# Patient Record
Sex: MALE | Race: WHITE
[De-identification: names, ages, dates, MRNs, and addresses within clinical notes are randomized per-mention and may not be internally consistent; named-entity substitution may affect disease eponyms.]

---

## 2017-01-27 ENCOUNTER — HOSPITAL ENCOUNTER (EMERGENCY)
Dept: HOSPITAL 25 - UCEAST | Age: 62
Discharge: HOME | End: 2017-01-27
Payer: MEDICARE

## 2017-01-27 VITALS — DIASTOLIC BLOOD PRESSURE: 71 MMHG | SYSTOLIC BLOOD PRESSURE: 108 MMHG

## 2017-01-27 DIAGNOSIS — K52.9: Primary | ICD-10-CM

## 2017-01-27 PROCEDURE — 99212 OFFICE O/P EST SF 10 MIN: CPT

## 2017-01-27 PROCEDURE — G0463 HOSPITAL OUTPT CLINIC VISIT: HCPCS

## 2017-01-27 NOTE — UC
Abdominal Pain Male HPI





- HPI Summary


HPI Summary: 





62 yo male (non verbal/MR) presents with one episode of diarrhea and one 

episode of vomiting in past 24 hours


no fever


vomited about 1 pm


since then has been able to tolerate clear liquids








- History of Current Complaint


Chief Complaint: UCGI


Stated Complaint: VOMITING


Time Seen by Provider: 01/27/17 17:47


Hx Obtained From: Family/Caretaker


Hx From Patient Unobtainable Due To: Other - no verbal


Onset/Duration: Gradual Onset, Lasting Days - 1


Timing: Intermittent Episodes Lasting: - NA


Severity Initially: Mild


Severity Currently: None


Pain Intensity: 0


Pain Scale Used: Adult Non Verbal


Location: Other - none


Aggravating Factor(s):: Nothing


Alleviating Factor(s): Nothing


Associated Signs And Symptoms: Positive: Vomiting - x1, Diarrhea - x1





- Allergies/Home Medications


Allergies/Adverse Reactions: 


 Allergies











Allergy/AdvReac Type Severity Reaction Status Date / Time


 


No Known Drug Allergy Allergy  Unknown Verified 01/27/17 16:42





   Reaction  





   Details  


 


Peanut Allergy  Unknown Uncoded 01/27/17 16:42





   Reaction  





   Details  











Home Medications: 


 Home Medications





Acetaminophen [Tylenol] 650 mg PO Q4H PRN 01/27/17 [History Confirmed 01/27/17]


Docusate Sodium [Colace] 100 mg PO BID 01/27/17 [History Confirmed 01/27/17]


Ibuprofen [Ibuprofen 200 MG] 200 mg PO TID PRN 01/27/17 [History Confirmed 01/27 /17]


Levetiracetam [Keppra 500] 1,000 mg PO BID 01/27/17 [History Confirmed 01/27/17]


Magnesium Hydroxide LIQ* [Milk of Magnesia LIQ*] 30 ml PO BEDTIME 01/27/17 [

History Confirmed 01/27/17]











PMH/Surg Hx/FS Hx/Imm Hx


Previously Healthy: Yes


Endocrine History Of: 


   Denies: Diabetes, Thyroid Disease


Cardiovascular History Of: 


   Denies: Cardiac Disorders, Hypertension


Respiratory History Of: 


   Denies: COPD, Asthma


GI/ History Of: 


   Denies: Ulcer


Neurological History Of: Reports: Seizures





- Surgical History


Surgical History: Unable to Obtain/Confirm





- Family History


Known Family History: Positive: Unknown


Family History: UNKNOWN AS PT NON VERBAL, according to caregiver is negative 

for heart, lungs, cancer, and diabetes.





- Social History


Alcohol Use: None


Substance Use Type: None


Smoking Status (MU): Never Smoked Tobacco





Review of Systems


Constitutional: Negative


Skin: Negative


Eyes: Negative


ENT: Negative


Respiratory: Negative


Cardiovascular: Negative


Gastrointestinal: Vomiting, Diarrhea


Genitourinary: Negative


Motor: Negative


Neurovascular: Negative


Musculoskeletal: Negative


Neurological: Negative


Psychological: Negative


All Other Systems Reviewed And Are Negative: Yes





Physical Exam


Triage Information Reviewed: Yes


Appearance: Well-Appearing, No Pain Distress, Well-Nourished


Vital Signs: 


 Initial Vital Signs











Temp  98 F   01/27/17 16:42


 


Pulse  73   01/27/17 16:42


 


Resp  18   01/27/17 16:42


 


BP  108/71   01/27/17 16:42


 


Pulse Ox  99   01/27/17 16:42











Eyes: Positive: Conjunctiva Clear


ENT: Positive: Other: - moist mm.  Negative: Nasal congestion, Nasal drainage, 

Trismus, Muffled/hoarse voice


Neck: Positive: Supple, Nontender


Respiratory: Positive: Lungs clear, Normal breath sounds, No respiratory 

distress


Cardiovascular: Positive: RRR, No Murmur


Abdomen Description: Positive: Nontender, Soft.  Negative: Bruit, CVA 

Tenderness (R), CVA Tenderness (L), Distended, Guarding


Bowel Sounds: Positive: Present


Musculoskeletal: Positive: ROM Intact, No Edema


Neurological: Positive: Alert, Muscle Tone Normal


Psychological Exam: Normal





Abd Pain Male Course/Dx





- Differential Dx/Clinical Impression


Provider Diagnoses: acute gastroenteritis





Discharge





- Discharge Plan


Condition: Stable


Disposition: HOME


Prescriptions: 


Ondansetron TAB* [Zofran Tab*] 4 mg PO Q6H PRN #10 tab


 PRN Reason: Nausea


Patient Education Materials:  Gastroenteritis (ED)


Referrals: 


Jonna Davis MD [Primary Care Provider] - If Needed


Additional Instructions: 


clear liquids today


advance diet as tolerated tomorrow





recheck her or ER if symptoms worsen

## 2019-02-22 ENCOUNTER — HOSPITAL ENCOUNTER (EMERGENCY)
Dept: HOSPITAL 25 - UCEAST | Age: 64
Discharge: HOME | End: 2019-02-22
Payer: MEDICARE

## 2019-02-22 VITALS — DIASTOLIC BLOOD PRESSURE: 73 MMHG | SYSTOLIC BLOOD PRESSURE: 141 MMHG
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
## 2019-02-22 DIAGNOSIS — S62.637A: Primary | ICD-10-CM

## 2019-02-22 PROCEDURE — 99212 OFFICE O/P EST SF 10 MIN: CPT

## 2019-02-22 PROCEDURE — G0463 HOSPITAL OUTPT CLINIC VISIT: HCPCS

## 2019-02-22 NOTE — UC
Hand/Wrist HPI





- HPI Summary


HPI Summary: 





NONVERBAL PATIENT ARRIVES WITH CAREGIVERS WHO NOTICED HIS LEFT HAND WAS BRUISED 

AND SWOLLEN OVERLYING HIS LEFT FIFTH METACARPAL AND FIFTH FINGER.  UNKNOWN 

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  PATIENT DOES NOT EXHIBIT ANY 

SIGNS OF DISCOMFORT AND IS USING HIS HAND PER HIS NORMAL. APPEARS HAPPY AND IN 

NO DISTRESS. 





- History Of Current Complaint


Chief Complaint: UCUpperExtremity


Stated Complaint: L PINKY FINGER INJURY


Time Seen by Provider: 02/22/19 11:41


Hx Obtained From: Family/Caretaker


Severity Initially: Mild


Severity Currently: Mild


Pain Intensity: 0


Pain Scale Used: 0-10 Numeric


Character Of Pain: Unable To Describe


Associated Signs And Symptoms: Positive: Swelling, Bruising





- Allergies/Home Medications


Allergies/Adverse Reactions: 


 Allergies











Allergy/AdvReac Type Severity Reaction Status Date / Time


 


No Known Allergies Allergy   Verified 02/22/19 11:44














PMH/Surg Hx/FS Hx/Imm Hx





- Additional Past Medical History


Additional PMH: 





MR, NON VERBAL


Neurological History: Seizures





- Surgical History


Surgical History: None





- Family History


Known Family History: Positive: Unknown


Family History: UNKNOWN AS PT NON VERBAL, according to caregiver is negative 

for heart, lungs, cancer, and diabetes.





- Social History


Alcohol Use: None


Substance Use Type: None


Smoking Status (MU): Never Smoked Tobacco





Review of Systems


All Other Systems Reviewed And Are Negative: Yes


Constitutional: Positive: Negative


Skin: Positive: Bruising


Respiratory: Positive: Negative


Cardiovascular: Positive: Negative


Gastrointestinal: Positive: Negative


Musculoskeletal: Positive: Edema





Physical Exam


Triage Information Reviewed: Yes


Appearance: Well-Appearing, No Pain Distress, Well-Nourished


Vital Signs: 


 Initial Vital Signs











Temp  96 F   02/22/19 11:39


 


Pulse  83   02/22/19 11:39


 


Resp  18   02/22/19 11:39


 


BP  141/73   02/22/19 11:39


 


Pulse Ox  99   02/22/19 11:39











Vital Signs Reviewed: Yes


Eyes: Positive: Conjunctiva Clear


ENT: Positive: Hearing grossly normal


Neck: Positive: Supple


Respiratory: Positive: No respiratory distress, No accessory muscle use


Cardiovascular: Positive: Pulses Normal


Abdomen Description: Positive: Soft


Musculoskeletal: Positive: ROM Intact, Edema @ - LEFT 5TH FINGER, Other: - NO 

CLEAR TENDERNESS TO PALPATION


Neurological: Positive: Alert


Psychological: Positive: Other:


Skin: Positive: Other - BRUISING OVERLYING PROXIMAL LEFT 5TH FINGER AND 5TH 

METACARPAL





Diagnostics





- Radiology


  ** LEFT HAND XRAY


Radiology Interpretation Completed By: Radiologist


Summary of Radiographic Findings: QUESTIONABLE NON DISPLACED FRACTURE BASE OF 

PROXIMAL 5TH PHALANX





Hand/Wrist Course/Dx





- Course


Course Of Treatment: QUESTIONABLE NONDISPLACED FRACTURE AT THE BASE OF THE LEFT 

FIFTH PROXIMAL PHALANX.  PATIENT DOES NOT MANIFEST ANY SIGNS OF DISCOMFORT AND 

IS USING HIS HAND PER HIS NORMAL.  FINGER SPLINT APPLIED HOWEVER PATIENT IS 

UNLIKELY TO KEEP THIS ON FOR VERY LONG.  HE WILL FOLLOW-UP WITH ORTHOPEDICS IN 

THE NEXT WEEK OR 2 FOR REEVALUATION.





- Differential Dx/Diagnosis


Provider Diagnosis: 


 Fracture of phalanx of left little finger








Discharge





- Sign-Out/Discharge


Documenting (check all that apply): Patient Departure


All imaging exams completed and their final reports reviewed: Yes





- Discharge Plan


Condition: Stable


Disposition: HOME


Patient Education Materials:  Finger Fracture (ED)


Referrals: 


Jonna Davis MD [Primary Care Provider] - If Needed


Sari Gonzalez MD [Medical Doctor] - 2 Weeks


Additional Instructions: 


POSSIBLE TINY FRACTURE AT THE BASE OF MÓNICA'S LEFT FIFTH FINGER.  SPLINT APPLIED 

TODAY HOWEVER IF HE DOES NOT TOLERATE THIS IT IS OKAY IF HE DOES NOT WEAR IT.  

CALL ORTHOPEDICS TODAY TO SCHEDULE A FOLLOW-UP APPOINTMENT IN THE NEXT 1-2 

WEEKS FOR REEVALUATION.  TYLENOL AS NEEDED FOR DISCOMFORT.





- Billing Disposition and Condition


Condition: STABLE


Disposition: Home

## 2019-02-22 NOTE — XMS REPORT
Continuity of Care Document (CCD)

 Created on:2019



Patient:Aldo Rebollar

Sex:Male

:1955

External Reference #:[id number]





Demographics







 Address  [address] [address]

 

 Home Phone  [phone]

 

 Email Address  [email or web address]

 

 Preferred Language  en

 

 Marital Status  Not  or 

 

 Yazidi Affiliation  Unknown

 

 Race  White

 

 Ethnic Group  Not  or 









Author







 Name  Chiqui Lorenzana









Support







 Name  Relationship  Address  Phone

 

 Allie Boothe  Unavailable  Unavailable  [phone]

 

 Dawit Rebollar  Unavailable  Unavailable  [phone]

 

 IssacMine de souza  Unavailable  Unavailable  [phone]

 

 MylaJose rivas  Unavailable  Unavailable  [phone]









Care Team Providers







 Name  Role  Phone

 

 Jonna Chan MD  Primary Care Physician  Unavailable









Payers







 Date  Identification Numbers  Payment Provider  Subscriber

 

 Effective: 1995  Policy Number: [id number]  Medicare  Aldo Rebollar









 PayID: [id number]  [address]









 [place] [address]









   Policy Number: [id number]  Medicaid  Aldo Rebollar









 Group Name: 1 1  [address]

 

 PayID: [id number]  [place] [address]







Advance Directives







 Description

 

 No Information Available







Problems







 Date  Description  Provider  Status

 

 Onset: 2015  Epilepsy  Susan Berman M.D.  Active

 

 Onset: 2015  Severe cognitive impairment  Susan Berman M.D.  Active

 

 Onset: 06/15/2018  Complex partial epileptic seizure  Reginald Petty MD  Active







Family History







 Description

 

 No Information Available







Social History







 Type  Date  Description  Comments

 

 Birth Sex    Unknown  

 

 Lives With    Adult family home  

 

 ETOH Use    Never used alcohol  

 

 Tobacco Use  Start: Unknown  Patient has never smoked  

 

 Smoking Status  Reviewed: 19  Patient has never smoked  

 

 Exercise Type/Frequency    Exercises regularly  







Allergies, Adverse Reactions, Alerts







 Description

 

 No Known Drug Allergies







Medications







 Medication  Date  Status  Form  Strength  Qnty  SIG  Indications  Ordering



                 Provider

 

 Ibuprofen    Active  Tablets  400mg  30tab  one three  S82.62xA  



   /        s  times a day    YOSSI Martinez



             as needed    



             for pain    

 

 Phenobarbital    Active  Tablets  97.2mg  60tab  1 by mouth    deuce        s  twice a day    YOSSI Jolley

 

 Polyethylene    Active  Powder      17 gm in    Unknown



 Glycol 1000  0000          liquid po    



             qod    

 

 Tab-A-Bg    Active  Tablets      1 po qd    Unknown



                 

 

 Acetaminophen    Active  Tablets  325mg    take 2 tabs    Unknown



             po q4hrs prn    



             for minor    



             pain or    



             elevated    



             temp    

 

 Mineral Oil    Active  Oil    30uni  instill 3    Unknown



   /0000        ts  drops each    



             ear x 3 days    



             as needed    



             then flushed    

 

 Enema    Active  Enema  7-19GM/11    as needed    Unknown



   /0000      8ML    constipation    

 

 Carmex    Active  Ointment      to affected    Unknown



   /0000          area on lips    



             bid prn    

 

 Keppra    Active  Tablets  1000mg  60tab  1 by mouth            s  twice a day    MD Malinda

 

 Peridex    Active  Solution  0.12%    5 cc twice    Unknown



   /          daily    

 

 Colace    Active  Capsules  100mg    1 tab by    Unknown



   /          mouth 2-3    



             times a day    



             as needed    

 

                 

 

 Benzoyl    Hx  Gel  5%  42.50  apply daily    Unknown



 Peroxide          0gm  in the    



   -          evening    



                 

 

 Chlorhexidine    Hx  Solution  0.12%  473un  5cc po bid    Unknown



 Gluconate          its      



 Oral Rinse  -              



                 

 

 Phenytoin    Hx  Capsules  100mg    1 By Mouth    Unknown



 Sodium            Three Times    



 Extended  -          Daily    



                 

 

 Cephalexin    Hx  Capsules  500mg    1 by mouth    Unknown



             three times    



   -          a day    



                 







Immunizations







 Description

 

 No Information Available







Vital Signs







 Date  Vital  Result  Comment

 

 2019  9:12am  Height  71 inches  5'11"









 Weight  165.00 lb  

 

 Heart Rate  60 /min  

 

 BP Systolic  110 mmHg  

 

 BP Diastolic  60 mmHg  

 

 BMI (Body Mass Index)  23.0 kg/m2  









 06/15/2018  8:43am  Height  71 inches  5'11"









 Weight  176.12 lb  

 

 BP Systolic  110 mmHg  

 

 BP Diastolic  78 mmHg  

 

 BMI (Body Mass Index)  24.6 kg/m2  









 02/15/2018  9:00am  Height  71 inches  5'11"









 Weight  178.00 lb  

 

 Respiratory Rate  20 /min  

 

 Body Temperature  96.6 F  

 

 BMI (Body Mass Index)  24.8 kg/m2  









 2017  8:47am  Height  71 inches  5'11"









 Weight  166.00 lb  

 

 Heart Rate  88 /min  

 

 BP Systolic Sitting  112 mmHg  

 

 BP Diastolic Sitting  80 mmHg  

 

 Respiratory Rate  14 /min  

 

 BMI (Body Mass Index)  23.1 kg/m2  









 10/27/2016  8:51am  Height  71 inches  5'11"









 Weight  148.00 lb  

 

 Pain Level  0  

 

 BMI (Body Mass Index)  20.6 kg/m2  









 2016 10:03am  Height  71 inches  5'11"









 Weight  148.00 lb  

 

 Heart Rate  68 /min  

 

 Respiratory Rate  16 /min  

 

 Pain Level  0  

 

 BMI (Body Mass Index)  20.6 kg/m2  









 2016  4:30pm  Height  71 inches  5'11"









 Weight  148.00 lb  

 

 Pain Level  1  

 

 BMI (Body Mass Index)  20.6 kg/m2  









 2016  9:01am  Height  71 inches  5'11"









 Weight  147.00 lb  

 

 Heart Rate  84 /min  

 

 BP Systolic Sitting  124 mmHg  

 

 BP Diastolic Sitting  76 mmHg  

 

 Respiratory Rate  14 /min  

 

 BMI (Body Mass Index)  20.5 kg/m2  









 2015  9:42am  Height  71 inches  5'11"









 Weight  145.00 lb  

 

 Heart Rate  68 /min  

 

 BP Systolic Sitting  120 mmHg  

 

 BP Diastolic Sitting  68 mmHg  

 

 Respiratory Rate  16 /min  

 

 BMI (Body Mass Index)  20.2 kg/m2  









 2014 10:03am  Height  71 inches  5'11"









 Weight  154.00 lb  

 

 Heart Rate  84 /min  

 

 BP Systolic Sitting  138 mmHg  

 

 BP Diastolic Sitting  88 mmHg  

 

 Respiratory Rate  12 /min  

 

 BMI (Body Mass Index)  21.5 kg/m2  









 2013  9:51am  Height  71 inches  5'11"









 Weight  162.00 lb  

 

 Heart Rate  72 /min  

 

 BP Systolic  112 mmHg  

 

 BP Diastolic  74 mmHg  

 

 Respiratory Rate  12 /min  

 

 BMI (Body Mass Index)  22.6 kg/m2  







Results







 Test  Date  Facility  Test  Result  H/L  Range  Note

 

 Laboratory test  2019  [facility]  Levetiracetam  15.1 g/mL
      1



 finding    [address]  ([facility]        



     [place] [address] [phone]          









 Phenobarbital  36.6 g/mL  High  17-34  









 Laboratory test  2018  [facility]  Levetiracetam  15.6 g/mL
      2



 finding    [address]  ([facility])        



     [place] [address] [phone]          









 Phenobarbital  27.4 g/mL  N  17-34  









 Laboratory test  2018  [facility]  Levetiracetam  26.8 g/mL
      3



 finding    [address]  ([place])        



     [place] [address] [phone]          









 Phenobarbital  30.1 g/mL  N  17-34  4









 CBC Auto Diff  2017  [facility]  White Blood  8.1 10^3/uL  N  
3.5-10.8  5



     [address]  Count        



     [place] [address] [phone]          









 Red Blood Count  4.23 10^6/uL  N  4.0-5.4  

 

 Hemoglobin  13.8 g/dL  Low  14.0-18.0  

 

 Hematocrit  42 %  N  42-52  

 

 Mean Corpuscular Volume  98 fL  High  80-94  

 

 Mean Corpuscular Hemoglobin  33 pg  High  27-31  

 

 Mean Corpuscular HGB Conc  33 g/dL  N  31-36  

 

 Red Cell Distribution Width  13 %  N  10.5-15  

 

 Platelet Count  222 10^3/uL  N  150-450  

 

 Mean Platelet Volume  9 um3  N  7.4-10.4  

 

 Abs Neutrophils  5.5 10^3/uL  N  1.5-7.7  

 

 Abs Lymphocytes  1.6 10^3/uL  N  1.0-4.8  

 

 Abs Monocytes  0.7 10^3/uL  N  0-0.8  

 

 Abs Eosinophils  0.3 10^3/uL  N  0-0.6  

 

 Abs Basophils  0 10^3/uL  N  0-0.2  

 

 Abs Nucleated RBC  0 10^3/uL  N    

 

 Granulocyte %  67.9 %  N  38-83  

 

 Lymphocyte %  19.9 %  Low  25-47  

 

 Monocyte %  8.4 %  N  1-9  

 

 Eosinophil %  3.3 %  N  0-6  

 

 Basophil %  0.5 %  N  0-2  

 

 Nucleated Red Blood Cells %  0  N    









 Iron & Iron Binding  2017  [facility]  Iron  136 g/dL  N  50
-212  



 Capacity    [address]          



     [place] [address] [phone]          









 Unsaturated Iron Binding  147 g/dL  N    

 

 Total Iron Binding Capacity  283 g/dL  N  250-450  

 

 % Iron Saturation  48 %  N  15-55  









 Laboratory test  2017  [facility]  Ferritin  64.1 ng/mL  N  24
-336  6



 finding    [address]          



     [place] [address] [phone]          

 

 Laboratory test  2017  [facility]  Phenobarbital  28.1  N  17-
34  7, 8



 finding    [address]    g/mL      



     [place] [address] [phone]          

 

 Comp Metabolic  09/15/2016  [facility]  Sodium  139 mmol/L  N  133-
145  9



 Panel    [address]  [place] [address] [phone]          









 Potassium  4.5 mmol/L  N  3.5-5.0  

 

 Chloride  103 mmol/L  N  101-111  

 

 Co2 Carbon Dioxide  31 mmol/L  N  22-32  

 

 Anion Gap  5 mmol/L  N  2-11  

 

 Glucose  77 mg/dL  N    

 

 Blood Urea Nitrogen  14 mg/dL  N  6-24  

 

 Creatinine  0.70 mg/dL  N  0.67-1.17  

 

 BUN/Creatinine Ratio  20.0  N  8-20  

 

 Calcium  8.9 mg/dL  N  8.6-10.3  

 

 Total Protein  6.9 g/dL  N  6.4-8.9  

 

 Albumin  4.0 g/dL  N  3.2-5.2  

 

 Globulin  2.9 g/dL  N  2-4  

 

 Albumin/Globulin Ratio  1.4  N  1-3  

 

 Total Bilirubin  0.30 mg/dL  N  0.2-1.0  

 

 Alkaline Phosphatase  83 U/L  N    

 

 Alt  20 U/L  N  7-52  

 

 Ast  24 U/L  N  13-39  

 

 Egfr Non-  115.8  N  >60  

 

 Egfr   149.0  N  >60  10









 Iron & Iron Binding  09/15/2016  [facility]  Iron  98 g/dL  N  50-
212  



 Capacity    [address]  [place] [address] [phone]          









 Unsaturated Iron Binding  203 g/dL  N    

 

 Total Iron Binding Capacity  301 g/dL  N  250-450  

 

 % Iron Saturation  33 %  N  15-55  









 Laboratory test  09/15/2016  [facility]  Ferritin  31.5 ng/mL  N  24
-336  11



 finding    [address]          



     [place] [address] [phone]          









 Phenobarbital  24.0 g/mL  N  17-34  12









 CBC Auto Diff  09/15/2016  [facility]  White Blood  6.2 10^3/uL  N  
3.5-10.8  



     [address]  Count        



     [place] [address] [phone]          









 Red Blood Count  4.48 10^6/uL  N  4.0-5.4  

 

 Hemoglobin  14.2 g/dL  N  14.0-18.0  

 

 Hematocrit  42 %  N  42-52  

 

 Mean Corpuscular Volume  94 fL  N  80-94  

 

 Mean Corpuscular Hemoglobin  32 pg  High  27-31  

 

 Mean Corpuscular HGB Conc  34 g/dL  N  31-36  

 

 Red Cell Distribution Width  14 %  N  10.5-15  

 

 Platelet Count  238 10^3/uL  N  150-450  

 

 Mean Platelet Volume  9 um3  N  7.4-10.4  

 

 Abs Neutrophils  3.8 10^3/uL  N  1.5-7.7  

 

 Abs Lymphocytes  1.5 10^3/uL  N  1.0-4.8  

 

 Abs Monocytes  0.6 10^3/uL  N  0-0.8  

 

 Abs Eosinophils  0.2 10^3/uL  N  0-0.6  

 

 Abs Basophils  0 10^3/uL  N  0-0.2  

 

 Abs Nucleated RBC  0.01 10^3/uL  N    

 

 Granulocyte %  61.8 %  N  38-83  

 

 Lymphocyte %  23.8 %  Low  25-47  

 

 Monocyte %  9.9 %  High  1-9  

 

 Eosinophil %  3.8 %  N  0-6  

 

 Basophil %  0.7 %  N  0-2  

 

 Nucleated Red Blood Cells %  0.1  N    









 CBC Auto Diff  2014  [facility]  White Blood  5.0 10^3/uL  N  
4.8-10.8  



     [address]  Count        



     [place] [address] [phone]          









 Red Blood Count  4.10 10^6/uL  N  4.0-5.4  

 

 Hemoglobin  13.6 g/dL  Low  14.0-18.0  

 

 Hematocrit  39 %  Low  42-52  

 

 Mean Corpuscular Volume  96 fL  High  80-94  

 

 Mean Corpuscular Hemoglobin  33 pg  High  27-31  

 

 Mean Corpuscular HGB Conc  35 g/dL  N  31-36  

 

 Red Cell Distribution Width  13 %  N  10.5-15  

 

 Platelet Count  210 10^3/uL  N  150-450  

 

 Mean Platelet Volume  9 um3  N  7.4-10.4  

 

 Abs Neutrophils  2.6 10^3/uL  N  1.5-7.7  

 

 Abs Lymphocytes  1.6 10^3/uL  N  1.0-4.8  

 

 Abs Monocytes  0.6 10^3/uL  N  0-0.8  

 

 Abs Eosinophils  0.2 10^3/uL  N  0-0.6  

 

 Abs Basophils  0 10^3/uL  N  0-0.2  

 

 Abs Nucleated RBC  0 10^3/uL  N    

 

 Granulocyte %  52.4 %  N  38-83  

 

 Lymphocyte %  31.5 %  N  25-47  

 

 Monocyte %  11.0 %  High  1-9  

 

 Eosinophil %  4.5 %  N  0-6  

 

 Basophil %  0.6 %  N  0-2  

 

 Nucleated Red Blood Cells %  0  N    









 Comp Metabolic Panel  2014  [facility]  Sodium  138 mmol/L  N
  133-145  



     [address]  [place] [address] [phone]          









 Potassium  3.9 mmol/L  N  3.7-5.6  

 

 Chloride  104 mmol/L  N  101-111  

 

 Co2 Carbon Dioxide  29 mmol/L  N  22-32  

 

 Anion Gap  5 mmol/L  N  2-11  

 

 Glucose  75 mg/dL  N    

 

 Blood Urea Nitrogen  12 mg/dL  N  6-24  

 

 Creatinine  0.76 mg/dL  N  0.67-1.17  

 

 BUN/Creatinine Ratio  15.8  N  8-20  

 

 Calcium  8.7 mg/dL  N  8.6-10.3  

 

 Total Protein  6.5 g/dL  N  6.4-8.9  

 

 Albumin  4.0 g/dL  N  3.2-5.2  

 

 Globulin  2.5 g/dL  N  2-4  

 

 Albumin/Globulin Ratio  1.6  N  1-3  

 

 Total Bilirubin  0.30 mg/dL  N  0.2-1.0  

 

 Alkaline Phosphatase  68 U/L  N    

 

 Alt  14 U/L  N  7-52  

 

 Ast  16 U/L  N  13-39  

 

 Egfr Non-  106.1  N  >60  

 

 Egfr   136.4  N  >60  13









 Iron & Iron Binding  2014  [facility]  Iron  116 g/dL  N  50
-212  



 Capacity    [address]  [place] [address] [phone]          









 Unsaturated Iron Binding  112 g/dL  N    

 

 Total Iron Binding Capacity  228 g/dL  Low  250-450  

 

 % Iron Saturation  51 %  N  15-55  









 Laboratory test  2014  [facility]  Ferritin  36.8 ng/mL  N  24
-336  



 finding    [address] [place] [address] [phone]          









 Phenobarbital  30.2 ug/mL  N  17-34  









 CBC Auto Diff  2014  [facility]  White Blood  6.1 10^3/uL  N  
4.8-10.8  



     [address]  Count        



     [place] [address] [phone]          









 Red Blood Count  4.17 10^6/uL  N  4.0-5.4  

 

 Hemoglobin  13.7 g/dL  Low  14.0-18.0  

 

 Hematocrit  40 %  Low  42-52  

 

 Mean Corpuscular Volume  95 fL  High  80-94  

 

 Mean Corpuscular Hemoglobin  33 pg  High  27-31  

 

 Mean Corpuscular HGB Conc  34 g/dL  N  31-36  

 

 Red Cell Distribution Width  12 %  N  10.5-15  

 

 Platelet Count  225 10^3/uL  N  150-450  

 

 Mean Platelet Volume  9 um3  N  7.4-10.4  

 

 Abs Neutrophils  2.7 10^3/uL  N  1.5-7.7  

 

 Abs Lymphocytes  2.3 10^3/uL  N  1.0-4.8  

 

 Abs Monocytes  0.6 10^3/uL  N  0-0.8  

 

 Abs Eosinophils  0.4 10^3/uL  N  0-0.6  

 

 Abs Basophils  0.1 10^3/uL  N  0-0.2  

 

 Abs Nucleated RBC  0.01 10^3/uL  N    

 

 Granulocyte %  43.7 %  N  38-83  

 

 Lymphocyte %  38.6 %  N  25-47  

 

 Monocyte %  10.0 %  High  1-9  

 

 Eosinophil %  6.7 %  High  0-6  

 

 Basophil %  1.0 %  N  0-2  

 

 Nucleated Red Blood Cells %  0.1  N    









 Comp Metabolic Panel  2014  [facility]  Sodium  139 mmol/L  N
  133-145  



     [address]          



     [place] [address] [phone]          









 Potassium  4.3 mmol/L  N  3.7-5.6  

 

 Chloride  104 mmol/L  N  101-111  

 

 Co2 Carbon Dioxide  30 mmol/L  N  22-32  

 

 Anion Gap  5 mmol/L  N  2-11  

 

 Glucose  73 mg/dL  N    

 

 Blood Urea Nitrogen  14 mg/dL  N  6-24  

 

 Creatinine  0.84 mg/dL  N  0.67-1.17  

 

 BUN/Creatinine Ratio  16.7  N  8-20  

 

 Calcium  8.8 mg/dL  N  8.6-10.3  

 

 Total Protein  6.6 g/dL  N  6.4-8.9  

 

 Albumin  4.0 g/dL  N  3.2-5.2  

 

 Globulin  2.6 g/dL  N  2-4  

 

 Albumin/Globulin Ratio  1.5  N  1-3  

 

 Total Bilirubin  0.30 mg/dL  N  0.2-1.0  

 

 Alkaline Phosphatase  62 U/L  N    

 

 Alt  18 U/L  N  7-52  

 

 Ast  17 U/L  N  13-39  

 

 Egfr Non-  94.5  N  >60  

 

 Egfr   121.6  N  >60  14









 Laboratory test  2014  [facility]  Ferritin  38.8 ng/mL  N  24
-336  



 finding    [address] [place] [address] [phone]          

 

 Comp Metabolic Panel  2014  [facility]  Sodium  137 mmol/L  N
  133-145  



     [address] [place] [address] [phone]          









 Potassium  4.1 mmol/L  N  3.7-5.6  

 

 Chloride  102 mmol/L  N  101-111  

 

 Co2 Carbon Dioxide  28 mmol/L  N  22-32  

 

 Anion Gap  7 mmol/L  N  2-11  

 

 Glucose  71 mg/dL  N    

 

 Blood Urea Nitrogen  11 mg/dL  N  6-24  

 

 Creatinine  0.76 mg/dL  N  0.67-1.17  

 

 BUN/Creatinine Ratio  14.5  N  8-20  

 

 Calcium  8.6 mg/dL  N  8.6-10.3  

 

 Total Protein  6.4 g/dL  N  6.4-8.9  

 

 Albumin  4.0 g/dL  N  3.2-5.2  

 

 Globulin  2.4 g/dL  N  2-4  

 

 Albumin/Globulin Ratio  1.7  N  1-3  

 

 Total Bilirubin  0.20 mg/dL  N  0.2-1.0  

 

 Alkaline Phosphatase  70 U/L  N    

 

 Alt  15 U/L  N  7-52  

 

 Ast  18 U/L  N  13-39  

 

 Egfr Non-  106.1  N  >60  

 

 Egfr   136.4  N  >60  15









 Iron & Iron Binding  2014  [facility]  Iron  51 g/dL  N  50-
212  



 Capacity    [address] [place] [address] [phone]          









 Unsaturated Iron Binding  240 g/dL  N    

 

 Total Iron Binding Capacity  291 g/dL  N  250-450  

 

 % Iron Saturation  18 %  N  15-55  









 Laboratory test  2014  [facility]  Ferritin  17.9 ng/mL  Low  
  



 finding    [address] [place] [address] [phone]          

 

 CBC With Manual  2014  [facility]  White Blood  7.3    4.8-
10.8  



 Diff    [address] [facility]  Count  10^3/uL      



     [place] [address] [phone]          









 Red Blood Count  4.53 10^6/uL    4.0-5.4  

 

 Hemoglobin  14.8 g/dL    14.0-18.0  

 

 Hematocrit  43 %    42-52  

 

 Mean Corpuscular Volume  95 fL  High  80-94  

 

 Mean Corpuscular Hemoglobin  33 pg  High  27-31  

 

 Mean Corpuscular HGB Conc  34 g/dL    31-36  

 

 Red Cell Distribution Width  13 %    10.5-15  

 

 Platelet Count  258 10^3/uL    150-450  

 

 Mean Platelet Volume  9 um3    7.4-10.4  

 

 Abs Neutrophils  4.9 10^3/uL    1.5-7.7  

 

 Abs Lymphocytes  1.4 10^3/uL    1.0-4.8  

 

 Abs Monocytes  0.7 10^3/uL    0-0.8  

 

 Abs Eosinophils  0.3 10^3/uL    0-0.6  

 

 Abs Basophils  0 10^3/uL    0-0.2  

 

 Abs Nucleated RBC  0 10^3/uL      

 

 Neutrophil %  75 %    38-83  

 

 Lymphocytes %  13 %  Low  25-47  

 

 Monocytes %  9 %    0-13  

 

 Eosinophils %  3 %    0-6  

 

 Macrocytosis  1+      









 Comp Metabolic Panel  2014  [facility]  Sodium  138 mmol/L    
133-145  



     [address]          



     [place] [address] [phone]          









 Potassium  3.9 mmol/L    3.7-5.6  

 

 Chloride  102 mmol/L    101-111  

 

 Co2 Carbon Dioxide  32 mmol/L    22-32  

 

 Anion Gap  4 mmol/L    2-11  

 

 Glucose  79 mg/dL      

 

 Blood Urea Nitrogen  11 mg/dL    6-24  

 

 Creatinine  0.80 mg/dL    0.67-1.17  

 

 BUN/Creatinine Ratio  13.8    8-20  

 

 Calcium  9.3 mg/dL    8.6-10.3  

 

 Total Protein  7.4 g/dL    6.4-8.9  

 

 Albumin  4.5 g/dL    3.2-5.2  

 

 Globulin  2.9 g/dL    2-4  

 

 Albumin/Globulin Ratio  1.6    1-3  

 

 Total Bilirubin  0.40 mg/dL    0.2-1.0  

 

 Alkaline Phosphatase  75 U/L      

 

 Alt  16 U/L    7-52  

 

 Ast  17 U/L    13-39  

 

 Egfr Non-  100.0    >60  

 

 Egfr   128.6    >60  16









 Laboratory test  2014  [facility]  Ferritin  43.9 ng/mL    24-
336  



 finding    [address]          



     [place] [address] [phone]          

 

 CBC Auto Diff  11/15/2013  [facility]  White Blood  5.4 10^3/uL    
4.8-10.8  



     [address]  Count        



     [place] [address] [phone]          









 Red Blood Count  4.20 10^6/uL    4.0-5.4  

 

 Hemoglobin  14.5 g/dL    14.0-18.0  

 

 Hematocrit  41 %  Low  42-52  

 

 Mean Corpuscular Volume  98 fL  High  80-94  

 

 Mean Corpuscular Hemoglobin  35 pg  High  27-31  

 

 Mean Corpuscular HGB Conc  35 g/dL    31-36  

 

 Red Cell Distribution Width  13 %    10.5-15  

 

 Platelet Count  221 10^3/uL    150-450  

 

 Mean Platelet Volume  9 um3    7.4-10.4  

 

 Abs Neutrophils  3.0 10^3/uL    1.5-7.7  

 

 Abs Lymphocytes  1.6 10^3/uL    1.0-4.8  

 

 Abs Monocytes  0.5 10^3/uL    0-0.8  

 

 Abs Eosinophils  0.2 10^3/uL    0-0.6  

 

 Abs Basophils  0 10^3/uL    0-0.2  

 

 Abs Nucleated RBC  0 10^3/uL      

 

 Granulocyte %  56.5 %    38-83  

 

 Lymphocyte %  28.9 %    25-47  

 

 Monocyte %  9.5 %  High  1-9  

 

 Eosinophil %  4.4 %    0-6  

 

 Basophil %  0.7 %    0-2  

 

 Nucleated Red Blood Cells %  0      









 Laboratory test  11/15/2013  [facility]  Ferritin  23 ng/mL  Low  24
-336  



 finding    [address] [place] [address] [phone]          

 

 Comp Metabolic  11/15/2013  [facility]  Sodium  140 mmol/L    133-
145  



 Panel    [address] [place] [address] [phone]          









 Potassium  4.3 mmol/L    3.5-5.0  

 

 Chloride  105 mmol/L    101-111  

 

 Co2 Carbon Dioxide  30.0 mmol/L    22-32  

 

 Anion Gap  5.0 mmol/L    2-11  

 

 Glucose  84 mg/dL      

 

 Blood Urea Nitrogen  11 mg/dL    6-24  

 

 Creatinine  0.70 mg/dL    0.50-1.40  

 

 BUN/Creatinine Ratio  15.7    8-20  

 

 Calcium  9.0 mg/dL    8.1-9.9  

 

 Total Protein  6.8 g/dL    6.2-8.1  

 

 Albumin  3.8 g/dL    3.6-5.4  

 

 Globulin  3.0 g/dL    2-4  

 

 Albumin/Globulin Ratio  1.3    1-3  

 

 Total Bilirubin  0.6 mg/dL    0.4-1.5  

 

 Alkaline Phosphatase  76 U/L      

 

 Alt  26 U/L    14-54  

 

 Ast  22 U/L    12-42  

 

 Egfr Non-  116.7    >60  

 

 Egfr   150.0    >60  17









 Comp Metabolic Panel  2013  [facility]  Sodium  137 mmol/L    
133-145  



     [address]          



     [place] [address] [phone]          









 Potassium  3.8 mmol/L    3.5-5.0  

 

 Chloride  105 mmol/L    101-111  

 

 Co2 Carbon Dioxide  27.0 mmol/L    22-32  

 

 Anion Gap  5.0 mmol/L    2-11  

 

 Glucose  76 mg/dL      

 

 Blood Urea Nitrogen  9 mg/dL    6-24  

 

 Creatinine  0.80 mg/dL    0.50-1.40  

 

 BUN/Creatinine Ratio  11.3    8-20  

 

 Calcium  8.5 mg/dL    8.1-9.9  

 

 Total Protein  5.3 g/dL  Low  6.2-8.1  

 

 Albumin  3.6 g/dL    3.6-5.4  

 

 Globulin  1.7 g/dL  Low  2-4  

 

 Albumin/Globulin Ratio  2.1    1-3  

 

 Total Bilirubin  0.5 mg/dL    0.4-1.5  

 

 Alkaline Phosphatase  58 U/L      

 

 Alt  17 U/L    14-54  

 

 Ast  19 U/L    12-42  

 

 Egfr Non-  100.4    >60  

 

 Egfr   129.1    >60  18









 Liver Function  2013  [facility]  Direct  < 0.1 mg/dL  Low  
0.1-0.5  



 Panel    [address]  Bilirubin        



     [place] [address] [phone]          









 Indirect Bilirubin  (SEE NOTE) mg/dL    0.3-1.0  19

 

 Direct Bilirubin  < 0.1 mg/dL  Low  0.1-0.5  

 

 Indirect Bilirubin  (SEE NOTE) mg/dL    0.3-1.0  20









 Laboratory test  2013  [facility]  Phenobarbital  29.3 g/mL
    15.0-40.0  21



 finding    [address]          



     [place] [address] [phone]          









 TSH (Thyroid Stimulating Horm)  2.25 miu/mL    0.34-5.60  









 Lipid Profile  2013  [facility]  Triglycerides  40 mg/dL    40
-200  



 (Trig/Chol/HDL)    [address]          



     [place] [address] [phone]          









 Cholesterol  142 mg/dL    Less than 200  

 

 HDL Cholesterol  43 mg/dL    40-60  22

 

 Cholesterol/HDL Ratio  3.3 Average    1-4.44  

 

 LDL Cholesterol  91.0    Less Than 100  23









 Laboratory test  2013  [facility]  PSA Screening  0.1 ng/mL  
  0-4.0  24



 finding    [address]          



     [place] [address] [phone]          

 

 CBC Auto Diff  2013  [facility]  White Blood  5.4    4.8-10.8
  



     [address]  Count  10^3/uL      



     [place] [address] [phone]          









 Red Blood Count  3.86 10^6/uL  Low  4.0-5.4  

 

 Hemoglobin  12.9 g/dL  Low  14.0-18.0  

 

 Hematocrit  37 %  Low  42-52  

 

 Mean Corpuscular Volume  97 fL  High  80-94  

 

 Mean Corpuscular Hemoglobin  33 pg  High  27-31  

 

 Mean Corpuscular HGB Conc  35 g/dL    31-36  

 

 Red Cell Distribution Width  13 %    10.5-15  

 

 Platelet Count  191 10^3/uL    150-450  

 

 Mean Platelet Volume  9 um3    7.4-10.4  

 

 Abs Neutrophils  2.7 10^3/uL    1.5-7.7  

 

 Abs Lymphocytes  1.9 10^3/uL    1.0-4.8  

 

 Abs Monocytes  0.5 10^3/uL    0-0.8  

 

 Abs Eosinophils  0.2 10^3/uL    0-0.6  

 

 Abs Basophils  0 10^3/uL    0-0.2  

 

 Abs Nucleated RBC  0.01 10^3/uL      

 

 Granulocyte %  50.3 %    38-83  

 

 Lymphocyte %  34.9 %    25-47  

 

 Monocyte %  10.1 %  High  1-9  

 

 Eosinophil %  4.2 %    0-6  

 

 Basophil %  0.5 %    0-2  

 

 Nucleated Red Blood Cells %  0.1      









 CBC Auto Diff  2013  [facility]  White Blood  5.2 10^3/uL    
4.8-10.8  



     [address]  Count        



     [place] [address] [phone]          









 Red Blood Count  4.15 10^6/uL    4.0-5.4  

 

 Hemoglobin  13.7 g/dL  Low  14.0-18.0  

 

 Hematocrit  41 %  Low  42-52  

 

 Mean Corpuscular Volume  100 fL  High  80-94  

 

 Mean Corpuscular Hemoglobin  33 pg  High  27-31  

 

 Mean Corpuscular HGB Conc  33 g/dL    31-36  

 

 Red Cell Distribution Width  12 %    10.5-15  

 

 Platelet Count  199 10^3/uL    150-450  

 

 Mean Platelet Volume  10 um3    7.4-10.4  

 

 Abs Neutrophils  2.6 10^3/uL    1.5-7.7  

 

 Abs Lymphocytes  1.8 10^3/uL    1.0-4.8  

 

 Abs Monocytes  0.5 10^3/uL    0-0.8  

 

 Abs Eosinophils  0.2 10^3/uL    0-0.6  

 

 Abs Basophils  0 10^3/uL    0-0.2  

 

 Abs Nucleated RBC  0 10^3/uL      

 

 Granulocyte %  51.1 %    38-83  

 

 Lymphocyte %  33.9 %    25-47  

 

 Monocyte %  10.4 %  High  1-9  

 

 Eosinophil %  3.9 %    0-6  

 

 Basophil %  0.7 %    0-2  

 

 Nucleated Red Blood Cells %  0      









 Comp Metabolic Panel  2013  [facility]  Sodium  140 mmol/L    
133-145  



     [address]          



     [place] [address] [phone]          









 Potassium  4.1 mmol/L    3.5-5.0  

 

 Chloride  106 mmol/L    101-111  

 

 Co2 Carbon Dioxide  29.0 mmol/L    22-32  

 

 Anion Gap  5.0 mmol/L    2-11  

 

 Glucose  76 mg/dL      

 

 Blood Urea Nitrogen  12 mg/dL    6-24  

 

 Creatinine  0.80 mg/dL    0.50-1.40  

 

 BUN/Creatinine Ratio  15.0    8-20  

 

 Calcium  8.7 mg/dL    8.1-9.9  

 

 Total Protein  6.2 g/dL    6.2-8.1  

 

 Albumin  3.8 g/dL    3.6-5.4  

 

 Globulin  2.4 g/dL    2-4  

 

 Albumin/Globulin Ratio  1.6    1-3  

 

 Total Bilirubin  0.5 mg/dL    0.4-1.5  

 

 Alkaline Phosphatase  58 U/L      

 

 Alt  18 U/L    14-54  

 

 Ast  19 U/L    12-42  

 

 Egfr Non-  100.4    >60  

 

 Egfr   129.1    >60  25









 Iron & Iron Binding  2013  [facility]  Iron  124 g/dL    45-
182  



 Capacity    [address]          



     [place] [address] [phone]          









 Unsaturated Iron Binding  135 g/dL      

 

 Total Iron Binding Capacity  259 g/dL    250-450  

 

 % Iron Saturation  48 %    15-55  









 Laboratory test  2013  [facility]  Ferritin  22 ng/mL  Low  24
-336  



 finding    [address]          



     [place] [address] [phone]          

 

 CBC Auto Diff  2013  [facility]  White Blood  5.5    4.8-10.8
  



     [address]  Count  10^3/uL      



     [place] [address] [phone]          









 Red Blood Count  3.79 10^6/uL  Low  4.0-5.4  

 

 Hemoglobin  12.7 g/dL  Low  14.0-18.0  

 

 Hematocrit  37 %  Low  42-52  

 

 Mean Corpuscular Volume  97 fL  High  80-94  

 

 Mean Corpuscular Hemoglobin  34 pg  High  27-31  

 

 Mean Corpuscular HGB Conc  35 g/dL    31-36  

 

 Red Cell Distribution Width  13 %    10.5-15  

 

 Platelet Count  207 10^3/uL    150-450  

 

 Mean Platelet Volume  9 um3    7.4-10.4  

 

 Abs Neutrophils  3.0 10^3/uL    1.5-7.7  

 

 Abs Lymphocytes  1.7 10^3/uL    1.0-4.8  

 

 Abs Monocytes  0.5 10^3/uL    0-0.8  

 

 Abs Eosinophils  0.3 10^3/uL    0-0.6  

 

 Abs Basophils  0 10^3/uL    0-0.2  

 

 Abs Nucleated RBC  0.01 10^3/uL      

 

 Granulocyte %  54.8 %    38-83  

 

 Lymphocyte %  30.5 %    25-47  

 

 Monocyte %  9.3 %  High  1-9  

 

 Eosinophil %  4.7 %    0-6  

 

 Basophil %  0.7 %    0-2  

 

 Nucleated Red Blood Cells %  0.2      









 Laboratory test  2013  [facility]  Ferritin  30 ng/mL    24-
336  



 finding    [address]          



     [place] [address] [phone]          

 

 CBC Auto Diff  2013  [facility]  White Blood  4.5  Low  4.8-
10.8  



     [address]  Count  10^3/uL      



     [place] [address] [phone]          









 Red Blood Count  4.24 10^6/uL    4.0-5.4  

 

 Hemoglobin  13.4 g/dL  Low  14.0-18.0  

 

 Hematocrit  40 %  Low  42-52  

 

 Mean Corpuscular Volume  94 fL    80-94  

 

 Mean Corpuscular Hemoglobin  32 pg  High  27-31  

 

 Mean Corpuscular HGB Conc  34 g/dL    31-36  

 

 Red Cell Distribution Width  14 %    10.5-15  

 

 Platelet Count  205 10^3/uL    150-450  

 

 Mean Platelet Volume  9 um3    7.4-10.4  

 

 Abs Neutrophils  2.2 10^3/uL    1.5-7.7  

 

 Abs Lymphocytes  1.6 10^3/uL    1.0-4.8  

 

 Abs Monocytes  0.4 10^3/uL    0-0.8  

 

 Abs Eosinophils  0.2 10^3/uL    0-0.6  

 

 Abs Basophils  0 10^3/uL    0-0.2  

 

 Abs Nucleated RBC  0.01 10^3/uL      

 

 Granulocyte %  49.1 %    38-83  

 

 Lymphocyte %  36.3 %    25-47  

 

 Monocyte %  9.1 %  High  1-9  

 

 Eosinophil %  4.8 %    0-6  

 

 Basophil %  0.7 %    0-2  

 

 Nucleated Red Blood Cells %  0.2      









 Laboratory test  2013  [facility]  Ferritin  18 ng/mL  Low  24
-336  



 finding    [address] [place] [address] [phone]          









 1  -------------------REFERENCE VALUE--------------------------



   12.0 - 46.0



   -------------------ADDITIONAL INFORMATION-------------------



   This test was developed and its performance characteristics



   determined by [facility] in a manner consistent with CLIA



   requirements. This test has not been cleared or approved by



   the U.S. Food and Drug Administration.



   Test Performed by:



   [facility]



   [address] [place] [address]

 

 2  -------------------REFERENCE VALUE--------------------------



   12.0 - 46.0



   -------------------ADDITIONAL INFORMATION-------------------



   This test was developed and its performance characteristics



   determined by [facility] in a manner consistent with CLIA



   requirements. This test has not been cleared or approved by



   the U.S. Food and Drug Administration.



   Test Performed by:



   [facility] [place] - [address] [address]

 

 3  -------------------REFERENCE VALUE--------------------------



   12.0 - 46.0



   -------------------ADDITIONAL INFORMATION-------------------



   This test was developed and its performance characteristics



   determined by [facility] in a manner consistent with CLIA



   requirements. This test has not been cleared or approved by



   the U.S. Food and Drug Administration.



   Test Performed by:



   [facility] [place] [address]

 

 4  PLEASE FAX RESULTS TO: [id number]



   Draw prior to AM dose of medication

 

 5  [id number]

 

 6  [id number]

 

 7  [id number]

 

 8  [id number]

 

 9  [id number]

 

 10  *******Because ethnic data is not always readily available,



   this report includes an eGFR for both -Americans and



   non- Americans.****



   The National Kidney Disease Education Program (NKDEP) does



   not endorse the use of the MDRD equation for patients that



   are not between the ages of 18 and 70, are pregnant, have



   extremes of body size, muscle mass, or nutritional status,



   or are non- or non-.



   According to the National Kidney Foundation, irrespective of



   diagnosis, the stage of the disease is based on the level of



   kidney function:



   Stage Description                      GFR(mL/min/1.73 m(2))



   1     Kidney damage with normal or decreased GFR       90



   2     Kidney damage with mild decrease in GFR          60-89



   3     Moderate decrease in GFR                         30-59



   4     Severe decrease in GFR                           15-29



   5     Kidney failure                       <15 (or dialysis)

 

 11  [id number]

 

 12  [id number]

 

 13  *******Because ethnic data is not always readily available,



   this report includes an eGFR for both -Americans and



   non- Americans.****



   The National Kidney Disease Education Program (NKDEP) does



   not endorse the use of the MDRD equation for patients that



   are not between the ages of 18 and 70, are pregnant, have



   extremes of body size, muscle mass, or nutritional status,



   or are non- or non-.



   According to the National Kidney Foundation, irrespective of



   diagnosis, the stage of the disease is based on the level of



   kidney function:



   Stage Description                      GFR(mL/min/1.73 m(2))



   1     Kidney damage with normal or decreased GFR       90



   2     Kidney damage with mild decrease in GFR          60-89



   3     Moderate decrease in GFR                         30-59



   4     Severe decrease in GFR                           15-29



   5     Kidney failure                       <15 (or dialysis)

 

 14  *******Because ethnic data is not always readily available,



   this report includes an eGFR for both -Americans and



   non- Americans.****



   The National Kidney Disease Education Program (NKDEP) does



   not endorse the use of the MDRD equation for patients that



   are not between the ages of 18 and 70, are pregnant, have



   extremes of body size, muscle mass, or nutritional status,



   or are non- or non-.



   According to the National Kidney Foundation, irrespective of



   diagnosis, the stage of the disease is based on the level of



   kidney function:



   Stage Description                      GFR(mL/min/1.73 m(2))



   1     Kidney damage with normal or decreased GFR       90



   2     Kidney damage with mild decrease in GFR          60-89



   3     Moderate decrease in GFR                         30-59



   4     Severe decrease in GFR                           15-29



   5     Kidney failure                       <15 (or dialysis)

 

 15  *******Because ethnic data is not always readily available,



   this report includes an eGFR for both -Americans and



   non- Americans.****



   The National Kidney Disease Education Program (NKDEP) does



   not endorse the use of the MDRD equation for patients that



   are not between the ages of 18 and 70, are pregnant, have



   extremes of body size, muscle mass, or nutritional status,



   or are non- or non-.



   According to the National Kidney Foundation, irrespective of



   diagnosis, the stage of the disease is based on the level of



   kidney function:



   Stage Description                      GFR(mL/min/1.73 m(2))



   1     Kidney damage with normal or decreased GFR       90



   2     Kidney damage with mild decrease in GFR          60-89



   3     Moderate decrease in GFR                         30-59



   4     Severe decrease in GFR                           15-29



   5     Kidney failure                       <15 (or dialysis)

 

 16  *******Because ethnic data is not always readily available,



   this report includes an eGFR for both -Americans and



   non- Americans.****



   The National Kidney Disease Education Program (NKDEP) does



   not endorse the use of the MDRD equation for patients that



   are not between the ages of 18 and 70, are pregnant, have



   extremes of body size, muscle mass, or nutritional status,



   or are non- or non-.



   According to the National Kidney Foundation, irrespective of



   diagnosis, the stage of the disease is based on the level of



   kidney function:



   Stage Description                      GFR(mL/min/1.73 m(2))



   1     Kidney damage with normal or decreased GFR       90



   2     Kidney damage with mild decrease in GFR          60-89



   3     Moderate decrease in GFR                         30-59



   4     Severe decrease in GFR                           15-29



   5     Kidney failure                       <15 (or dialysis)

 

 17  *******Because ethnic data is not always readily available,



   this report includes an eGFR for both -Americans and



   non- Americans.****



   The National Kidney Disease Education Program (NKDEP) does



   not endorse the use of the MDRD equation for patients that



   are not between the ages of 18 and 70, are pregnant, have



   extremes of body size, muscle mass, or nutritional status,



   or are non- or non-.



   According to the National Kidney Foundation, irrespective of



   diagnosis, the stage of the disease is based on the level of



   kidney function:



   Stage Description                      GFR(mL/min/1.73 m(2))



   1     Kidney damage with normal or decreased GFR       90



   2     Kidney damage with mild decrease in GFR          60-89



   3     Moderate decrease in GFR                         30-59



   4     Severe decrease in GFR                           15-29



   5     Kidney failure                       <15 (or dialysis)

 

 18  *******Because ethnic data is not always readily available,



   this report includes an eGFR for both -Americans and



   non- Americans.****



   The National Kidney Disease Education Program (NKDEP) does



   not endorse the use of the MDRD equation for patients that



   are not between the ages of 18 and 70, are pregnant, have



   extremes of body size, muscle mass, or nutritional status,



   or are non- or non-.



   According to the National Kidney Foundation, irrespective of



   diagnosis, the stage of the disease is based on the level of



   kidney function:



   Stage Description                      GFR(mL/min/1.73 m(2))



   1     Kidney damage with normal or decreased GFR       90



   2     Kidney damage with mild decrease in GFR          60-89



   3     Moderate decrease in GFR                         30-59



   4     Severe decrease in GFR                           15-29



   5     Kidney failure                       <15 (or dialysis)

 

 19  Unable to calculate Ind Bili as D Bili is <0.1



   



   Unable to calculate Ind Bili as D Bili is <0.1

 

 20  Unable to calculate Ind Bili as D Bili is <0.1



   



   Unable to calculate Ind Bili as D Bili is <0.1

 

 21  The detection limit for Phenobarbitol is 0.5 mcg/ml .



   Values less than 0.5 mcg/ml cannot be accurately measured.

 

 22  HDL Interpretation:



   Undesirable: High Risk:  Less than 40 mg/dL



   Desirable:  Low Risk:  Greater than 60 mg/dL

 

 23  LDL Interpretation:



   Low Risk Optimal Level:  LDL Less than 100 mg/dL



   Near or Above Optimal:  -129 mg/dL



   Borderline High Risk:  -159 mg/dL



   High Risk:  -189 mg/dL



   Very High Risk:  LDL Greater than 189 mg/dL

 

 24  Serum levels of PSA measured using the [employer]



   DXI Hybritech immunoassay should not be interpreted as



   absolute evidence of the presence or absence of disease.



   The PSA value should be used in conjunction with other



   pertinent clinical diagnostic procedures.



   



   A PSA value in the range of 0.1 to 0.6 ng/ml is



   indeterminate if being used as an indicator of recurrent or



   residual disease.



   



   The values obtained with different assay methods or kits



   cannot be used interchangeably.

 

 25  *******Because ethnic data is not always readily available,



   this report includes an eGFR for both -Americans and



   non- Americans.****



   The National Kidney Disease Education Program (NKDEP) does



   not endorse the use of the MDRD equation for patients that



   are not between the ages of 18 and 70, are pregnant, have



   extremes of body size, muscle mass, or nutritional status,



   or are non- or non-.



   According to the National Kidney Foundation, irrespective of



   diagnosis, the stage of the disease is based on the level of



   kidney function:



   Stage Description                      GFR(mL/min/1.73 m(2))



   1     Kidney damage with normal or decreased GFR       90



   2     Kidney damage with mild decrease in GFR          60-89



   3     Moderate decrease in GFR                         30-59



   4     Severe decrease in GFR                           15-29



   5     Kidney failure                       <15 (or dialysis)







Procedures







 Date  Code  Description  Status

 

 2018  25631  EEG Recording Awake & Drowsy  Completed

 

 2016  45289  CLSD TX Distal Fib FX (Lateral Malleolus) w/o manipulation  
Completed

 

 2016  99734  Electroencephalogram (EEG) Extended Monitoring 41-60  
Completed



     Minutes  

 

 2016  46450  EEG Recording Awake & Asleep  Completed







Encounters







 Type  Date  Location  Provider  Dx  Diagnosis

 

 Office Visit  06/15/2018  [facility]  Reginald Petty,  G40.209  
Local-rel



   8:30a    MD    symptc epi w



           cmplx prt



           seiz,not



           ntrct,w/o stat



           epi









 Z79.899  Other long term (current) drug therapy









 Office Visit  02/15/2018  Orthopedic  Sari  L03.011  Cellulitis of



   8:30a  Services Of  YOSSI Gonzalez    right finger



     [facility]      

 

 Office Visit  2017  Mikayla WOLF  Unspecified



   8:45a  Neurologic  YOSSI Berman    intellectual



     [facility]      disabilities









 G40.909  Epilepsy, unsp, not intractable, without status epilepticus









 Office Visit  2016  8:45a  [facility]  Susan LINDQUIST  F79  Unspecified



     Services Of Davide Berman M.D.    intellectual



           disabilities









 G40.909  Epilepsy, unsp, not intractable, without status epilepticus









 Office Visit  2016  [facility]  Tyler  G40.901  Epilepsy, unsp,



   8:18a  Assmalorie garrett MD    not intractable,



     Hospitalists      with status



           epilepticus









 F79  Unspecified intellectual disabilities









 Office Visit  2016  [facility]  Vera  G40.901  Epilepsy, unsp,



   3:57p  Effie Belle MD    not intractable,



           with status



           epilepticus









 F79  Unspecified intellectual disabilities









 Office Visit  2016  8:18a  [facility]  Tyler  R56.9  Unspecified



     malorie Rascon MD    convulsions



     Hospitalists      









 F79  Unspecified intellectual disabilities









 Office Visit  2016  [facility]  Vera  G40.901  Epilepsy, unsp,



   3:54p  Clinic  MD Yoshi    not intractable,



           with status



           epilepticus









 F79  Unspecified intellectual disabilities

 

 R94.01  Abnormal electroencephalogram [EEG]









 Office Visit  2016  8:17a  [facility]  Tyler  R56.9  Unspecified



     Assoc,malorie Patten MD    convulsions



     Hospitalists      









 F79  Unspecified intellectual disabilities









 Office Visit  2016  [facility]  Vera  G40.901  Epilepsy, unsp,



   3:52p  Clinic  MD Yoshi    not intractable,



           with status



           epilepticus









 F79  Unspecified intellectual disabilities









 Office Visit  2016  8:15a  [facility]  Tyler  R56.9  Unspecified



     Assoc,malorie Patten MD    convulsions



     Hospitalists      









 F79  Unspecified intellectual disabilities









 Office  2016  [facility]  Reginald  G40.901  Epilepsy, unsp,



 Visit  3:48p  Clinic  MD Malinda    not intractable,



           with status



           epilepticus









 F79  Unspecified intellectual disabilities









 Office Visit  2016  8:14a  [facility]  Tyler  R56.9  Unspecified



     Assocmalorie MD    convulsions



     Hospitalists      









 F79  Unspecified intellectual disabilities









 Office Visit  2016  [facility]  Fred Frankenberg  R56.9  Unspecified



   8:09a  Assocmalorie II, M.D.    convulsions



     Hospitalists      









 F79  Unspecified intellectual disabilities









 Office Visit  2015  9:45a  [facility]  Susan LINDQUIST  319  Unspecified



     [facility]  YOSSI Berman    Intellectual



           Disabilities









 345.90  Epilepsy Unspec W/O Intractable









 Office Visit  2014  9:45a  Mikayla LINDQUIST  345.90  Epilepsy Unspec



     Neurologic  YOSSI Berman    W/O Intractable



     [facility]      









 319  Unspecified Intellectual Disabilities









 Office Visit  2013  9:45a  [facility]  Susan LINDQUITS  345.90  Epilepsy Unspec



     Neurologic  YOSSI Berman    W/O Intractable



     [facility]      









 319  Unspecified Intellectual Disabilities







Plan of Treatment

2019 - Reginald Petty MDG40.209 Localization-related (focal) (partial) 
symptomatic epilepsyComments:~B_Seizures quiet and tolerating his medications.  
Discussed that his phenobarb level is at a point where many people may feel 
drowsy or have fuzzy thinking but he seems fine and it is unlikely that 
decreasing would make a significant change in his day.  He has had status 
epielpticus when meds have been decreased in past and after discussing will 
leave his phenobarb alone.  Will see yearly but soonerfor problems~b_~b_Follow 
up:1 YEAR